# Patient Record
Sex: FEMALE | Race: WHITE
[De-identification: names, ages, dates, MRNs, and addresses within clinical notes are randomized per-mention and may not be internally consistent; named-entity substitution may affect disease eponyms.]

---

## 2019-10-22 ENCOUNTER — HOSPITAL ENCOUNTER (EMERGENCY)
Dept: HOSPITAL 74 - FER | Age: 49
Discharge: HOME | End: 2019-10-22
Payer: COMMERCIAL

## 2019-10-22 VITALS — DIASTOLIC BLOOD PRESSURE: 85 MMHG | SYSTOLIC BLOOD PRESSURE: 151 MMHG | HEART RATE: 73 BPM

## 2019-10-22 VITALS — BODY MASS INDEX: 26.9 KG/M2

## 2019-10-22 VITALS — TEMPERATURE: 98.5 F

## 2019-10-22 DIAGNOSIS — N30.00: Primary | ICD-10-CM

## 2019-10-22 LAB
ALBUMIN SERPL-MCNC: 4.5 G/DL (ref 3.4–5)
ALP SERPL-CCNC: 44 U/L (ref 45–117)
ALT SERPL-CCNC: 21 U/L (ref 13–61)
ANION GAP SERPL CALC-SCNC: 6 MMOL/L (ref 8–16)
AST SERPL-CCNC: 18 U/L (ref 15–37)
BASOPHILS # BLD: 0.4 % (ref 0–2)
BILIRUB SERPL-MCNC: 0.6 MG/DL (ref 0.2–1)
BUN SERPL-MCNC: 16 MG/DL (ref 7–18)
CALCIUM SERPL-MCNC: 9.1 MG/DL (ref 8.5–10)
CHLORIDE SERPL-SCNC: 105 MMOL/L (ref 98–107)
CO2 SERPL-SCNC: 26 MMOL/L (ref 21–32)
CREAT SERPL-MCNC: 0.8 MG/DL (ref 0.55–1.3)
DEPRECATED RDW RBC AUTO: 12.1 % (ref 11.6–15.6)
EOSINOPHIL # BLD: 0.7 % (ref 0–4.5)
EPITH CASTS URNS QL MICRO: (no result) /HPF
GLUCOSE SERPL-MCNC: 113 MG/DL (ref 74–106)
HCT VFR BLD CALC: 44 % (ref 32.4–45.2)
HGB BLD-MCNC: 14.5 GM/DL (ref 10.7–15.3)
LYMPHOCYTES # BLD: 22.8 % (ref 8–40)
MCH RBC QN AUTO: 30.8 PG (ref 25.7–33.7)
MCHC RBC AUTO-ENTMCNC: 33.1 G/DL (ref 32–36)
MCV RBC: 93.1 FL (ref 80–96)
MONOCYTES # BLD AUTO: 7.8 % (ref 3.8–10.2)
NEUTROPHILS # BLD: 68.3 % (ref 42.8–82.8)
PLATELET # BLD AUTO: 348 K/MM3 (ref 134–434)
PMV BLD: 8.3 FL (ref 7.5–11.1)
POTASSIUM SERPLBLD-SCNC: 4.4 MMOL/L (ref 3.5–5.1)
PROT SERPL-MCNC: 7.8 G/DL (ref 6.4–8.2)
RBC # BLD AUTO: 4.73 M/MM3 (ref 3.6–5.2)
SODIUM SERPL-SCNC: 137 MMOL/L (ref 136–145)
WBC # BLD AUTO: 8.8 K/MM3 (ref 4–10.8)

## 2019-10-22 PROCEDURE — 3E0333Z INTRODUCTION OF ANTI-INFLAMMATORY INTO PERIPHERAL VEIN, PERCUTANEOUS APPROACH: ICD-10-PCS

## 2019-10-22 NOTE — PDOC
Documentation entered by Bao Samuels SCRIBE, acting as scribe for Vinh Romero MD.








Vinh Romero MD:  This documentation has been prepared by the Arvind hamlin Aiswarya, SCRIBE, under my direction and personally reviewed by me in 

its entirety.  I confirm that the documentation accurately reflects all work, 

treatment, procedures, and medical decision making performed by me.  





History of Present Illness





- General


Chief Complaint: Pain


Stated Complaint: RIGHT FLANKL PAIN


Time Seen by Provider: 10/22/19 14:17


History Source: Patient


Exam Limitations: No Limitations





- History of Present Illness


Initial Comments: 





10/22/19 14:52


The patient is a 49  year old female, with a significant PMH of hypothyroidism 

and hernia repair (2007), who presents to the emergency department with back 

pain that began this morning. The patient states she woke up  this morning with 

right sided sharp constant pain that radiates to the left side. She states she 

lifted her granddaughter today and noticed pain progressively worsened.  

Patient reports pain is exacerbated with movement and deep inspiration, no 

medications were taken . The patient denies chest pain, shortness of breath, 

headache and dizziness.Denies fever, chills, nausea, vomit, diarrhea and 

constipation.Denies any history of clots or long travels. Denies any numbness 

or tingling.  Denies dysuria, frequency, urgency and hematuria.





Allergies: NKDA


Past surgical history: hernia repair 


Social history: None reported


PCP: None reported 








Past History





- Past Medical History


Allergies/Adverse Reactions: 


 Allergies











Allergy/AdvReac Type Severity Reaction Status Date / Time


 


No Known Allergies Allergy   Verified 10/22/19 14:21











Home Medications: 


Ambulatory Orders





Levothyroxine Sodium [Synthroid] 88 mcg PO DAILY 10/22/19 


Naproxen 500 mg PO BID PRN #20 tablet 10/22/19 


Sulfamethoxazole/Trimethoprim [Bactrim Ds -] 1 tab PO BID #14 tablet 10/22/19 








COPD: No


Thyroid Disease: Yes (HYPO)





- Surgical History


Abdominal Surgery:  (hernia repair in 2017)





- Psycho Social/Smoking Cessation Hx


Smoking History: Never smoked


Hx Alcohol Use: No


Drug/Substance Use Hx: No





**Review of Systems





- Review of Systems


Able to Perform ROS?: Yes


Comments:: 





10/22/19 14:53


GENERAL/CONSTITUTIONAL: No fever or chills. No weakness.


RESPIRATORY: No cough, wheezing, or hemoptysis.


GASTROINTESTINAL: No nausea, vomiting, diarrhea or constipation.


GENITOURINARY: No dysuria, frequency, or change in urination.


MUSCULOSKELETAL: +right sided back pain 


SKIN: No rash


NEUROLOGIC: No headache, vertigo, loss of consciousness, or change in strength/

sensation.


HEMATOLOGIC/LYMPHATIC: No anemia, easy bleeding, or history of blood clots.





Constitutional: No: Chills, Fever


HEENTM: Yes: Nose Congestion.  No: Throat Pain, Throat Swelling


Respiratory: Yes: See HPI.  No: Cough, Shortness of Breath


Cardiac (ROS): Yes: See HPI.  No: Edema


ABD/GI: No: Diarrhea, Vomiting


: No: Dysuria, Flank Pain, Hematuria


Musculoskeletal: Yes: Back Pain, Muscle Pain


All Other Systems: Reviewed and Negative





*Physical Exam





- Vital Signs


 Last Vital Signs











Temp Pulse Resp BP Pulse Ox


 


 98.5 F   83   18   154/99   100 


 


 10/22/19 14:10  10/22/19 14:10  10/22/19 14:10  10/22/19 14:10  10/22/19 14:10














- Physical Exam


Comments: 





10/22/19 14:53


GENERAL: The patient is awake, alert, and fully oriented, in no acute distress.


HEAD: Normal with no signs of trauma.


NECK: Normal range of motion, supple without lymphadenopathy, JVD, or masses.


LUNGS: Breath sounds equal, clear to auscultation bilaterally.  No wheeze/

crackles.


HEART: Regular rate and rhythm, normal S1 and S2 without murmur or rub.


ABDOMEN: Soft/nontender/nondistended. BS wnl.  No guarding or rebound.  No 

palpable masses. No hepatosplenomegaly.


EXTREMITIES: +reproducible discomfort located of the mid scapula and posterior 

aspect of rib 6 and 7. No abnormal deformities or bony tenderness.


NEUROLOGICAL: Cranial nerves II through XII grossly intact.  Normal speech, 

normal gait.


PSYCH: Normal mood, normal affect.


SKIN: Warm, Dry, normal turgor, no rashes or lesions noted.








**Heart Score/ECG Review


  ** #1


ECG reviewed & interpreted by me at: 14:49


General ECG Interpretation: Sinus Rhythm, Normal Rate (67), Normal Intervals (

qtc 435), No acute ischemic changes





ED Treatment Course





- LABORATORY


CBC & Chemistry Diagram: 


 10/22/19 14:30





 10/22/19 14:30





- RADIOLOGY


Radiology Studies Ordered: 














 Category Date Time Status


 


 CHEST PA & LAT [RAD] Stat Radiology  10/22/19 14:33 Ordered














Medical Decision Making





- Medical Decision Making





10/22/19 14:48


A portion of this note was documented by scribe services under my direction. I 

have reviewed the details of the note, within reason, and agree with the 

documentation with the following case summary and management plan written by me.





49-year-old female with history of hypothyroidism on Synthroid presents with 

nontraumatic right posterior thoracic pain.  Gradual onset and mild initially, 

and acutely worsened after picking up her granddaughter, worse with deep 

inspiration and positional changes since then.  Sharp and localized in nature, 

not associated with any cough or shortness of breath, no  or GI complaints.  

No similar pain in the past, no recent DVT or PE risk factors, no ACS risk 

factors.





Vitals are normal including heart rate and O2 sat


Visibly uncomfortable on exam with reproducible tenderness in the right 

posterior intercostal space


Lung sounds are clear, splinting on the right with slightly diminished breath 

sounds at the right base


Heart is regular without murmur, abdomen benign, no edema or calf tenderness


Skin is clear





49-year-old female with right thoracic pain, seems positional and reproducible 

suggesting musculoskeletal etiology such as intercostal rib strain, more 

concerning diagnoses such as PE or pneumothorax are not clinically consistent 

with patient's presentation. Less likely renal colic. Clinically ruled out for 

PE using Wells and PERC criteria.


EKG shows no acute abnormality


Check labs, chest x-ray


Trial of Toradol


Reassess





10/22/19 15:46


labs wnl, cxr normal, EKG normal. 


Pain significantly improved after toradol, feels much more comfortable. 


UA with 3+ leuk and trace blood, micro pending. pending results, may consider 

ctap for renal stone. 





10/22/19 17:41


Urine micro with 20-40 WBC and 5-10 RBC. CTAP performed given flank/thoracic 

pain to r/o superimposed obstructing stone - no acute abnormality noted. Pt 

markedly improved, moving and breathing more comfortably. 


Will treat for UTI given UA though clinically asx (? pyelo?), though 

presentation still seems most c/w intercostal muscle strain. 


nsaids, bactrim course. agrees with d/c plan, friend at bedside, understands 

return criteria. 








Discharge





- Discharge Information


Problems reviewed: Yes


Clinical Impression/Diagnosis: 


Right-sided thoracic back pain


Qualifiers:


 Chronicity: acute Qualified Code(s): M54.6 - Pain in thoracic spine





UTI (urinary tract infection)


Qualifiers:


 Urinary tract infection type: acute cystitis Hematuria presence: without 

hematuria Qualified Code(s): N30.00 - Acute cystitis without hematuria





Condition: Improved


Disposition: HOME





- Additional Discharge Information


Prescriptions: 


Naproxen 500 mg PO BID PRN #20 tablet


 PRN Reason: Pain


Sulfamethoxazole/Trimethoprim [Bactrim Ds -] 1 tab PO BID #14 tablet





- Follow up/Referral





- Patient Discharge Instructions


Patient Printed Discharge Instructions:  DI for Muscle Strain, DI for Urinary 

Tract Infection (UTI)


Additional Instructions: 


Activity as tolerated. Stay hydrated. 





Your back pain seems most consistent with a rib muscle strain, likely 

exacerbated by lifting and movement.  This is a muscle inflammation and spasm 

which should go away over the next few days, take naproxen twice daily for 3 

days, then as needed for pain as prescribed.





Blood tests, a chest x-ray, and a CAT scan of the abdomen and pelvis showed no 

acute abnormalities.  A urine test did show evidence of a urinary tract 

infection, take Bactrim as prescribed for 1 week as an antibiotic.





Continue your medications as previously prescribed by your physician.





You should follow up with your primary doctor as soon as possible regarding 

today's emergency department visit.





Return to the emergency department for any new or concerning symptoms, 

particularly persistent or worsening pain, difficulty breathing or palpitations

, fevers or chills, abdominal pain.





- Post Discharge Activity

## 2019-10-23 NOTE — EKG
Test Reason : 

Blood Pressure : ***/*** mmHG

Vent. Rate : 067 BPM     Atrial Rate : 067 BPM

   P-R Int : 182 ms          QRS Dur : 082 ms

    QT Int : 412 ms       P-R-T Axes : 051 022 020 degrees

   QTc Int : 435 ms

 

NORMAL SINUS RHYTHM

NORMAL ECG

NO PREVIOUS ECGS AVAILABLE

Confirmed by CHELY SWAIN, MAX ([id number]) on 10/23/2019 9:42:52 AM

 

Referred By:             Confirmed By:MAX MO MD